# Patient Record
Sex: FEMALE | Race: BLACK OR AFRICAN AMERICAN | NOT HISPANIC OR LATINO | Employment: FULL TIME | URBAN - METROPOLITAN AREA
[De-identification: names, ages, dates, MRNs, and addresses within clinical notes are randomized per-mention and may not be internally consistent; named-entity substitution may affect disease eponyms.]

---

## 2019-06-14 ENCOUNTER — HOSPITAL ENCOUNTER (EMERGENCY)
Facility: HOSPITAL | Age: 24
Discharge: HOME | End: 2019-06-14
Attending: EMERGENCY MEDICINE
Payer: MEDICAID

## 2019-06-14 VITALS
SYSTOLIC BLOOD PRESSURE: 139 MMHG | RESPIRATION RATE: 20 BRPM | HEART RATE: 76 BPM | OXYGEN SATURATION: 100 % | TEMPERATURE: 98 F | DIASTOLIC BLOOD PRESSURE: 65 MMHG | WEIGHT: 293 LBS | HEIGHT: 63 IN | BODY MASS INDEX: 51.91 KG/M2

## 2019-06-14 DIAGNOSIS — M65.229: Primary | ICD-10-CM

## 2019-06-14 DIAGNOSIS — M77.01 MEDIAL EPICONDYLITIS OF ELBOW, RIGHT: ICD-10-CM

## 2019-06-14 DIAGNOSIS — M25.521 RIGHT ELBOW PAIN: ICD-10-CM

## 2019-06-14 PROCEDURE — 99283 EMERGENCY DEPT VISIT LOW MDM: CPT

## 2019-06-14 PROCEDURE — 25000003 PHARM REV CODE 250: Performed by: EMERGENCY MEDICINE

## 2019-06-14 RX ORDER — NAPROXEN 500 MG/1
500 TABLET ORAL
Status: COMPLETED | OUTPATIENT
Start: 2019-06-14 | End: 2019-06-14

## 2019-06-14 RX ORDER — NAPROXEN 500 MG/1
500 TABLET ORAL 2 TIMES DAILY WITH MEALS
Qty: 20 TABLET | Refills: 0 | Status: SHIPPED | OUTPATIENT
Start: 2019-06-14 | End: 2019-06-24

## 2019-06-14 RX ADMIN — NAPROXEN 500 MG: 500 TABLET ORAL at 08:06

## 2019-06-14 NOTE — ED PROVIDER NOTES
SCRIBE #1 NOTE: I, Ha Bellamy/Shaneka Harris, am scribing for, and in the presence of, Steven Elliott Jr., MD. I have scribed the entire note.      History      Chief Complaint   Patient presents with    Elbow Pain     non traumatic elbow pain x 2 days       Review of patient's allergies indicates:  No Known Allergies     HPI   HPI    6/14/2019, 7:23 AM   History obtained from the patient      History of Present Illness: Evy Wyman is a 23 y.o. female patient who presents to the Emergency Department c/o R elbow pain which onset gradually 2 days PTA. Symptoms are constant and moderate in severity. Sxs worse with movement, no mitigating factors. Patient denies any fever, chills, extremity weakness/numbness, falls, trauma, joint swelling, erythema, ecchymosis, and all other sxs at this time. No Prior Tx. No further complaints or concerns at this time.        Arrival mode: Personal vehicle      PCP: Primary Doctor No       Past Medical History:  History reviewed. No pertinent past medical history.    Past Surgical History:  Past Surgical History:   Procedure Laterality Date    CHOLECYSTECTOMY           Family History:  History reviewed. No pertinent family history.    Social History:  Social History     Tobacco Use    Smoking status: Never Smoker    Smokeless tobacco: Never Used   Substance and Sexual Activity    Alcohol use: Yes     Comment: occassionally    Drug use: Never    Sexual activity: Yes       ROS   Review of Systems   Constitutional: Negative for chills and fever.   HENT: Negative for sore throat.    Respiratory: Negative for shortness of breath.    Cardiovascular: Negative for chest pain.   Gastrointestinal: Negative for nausea.   Genitourinary: Negative for dysuria.   Musculoskeletal: Positive for arthralgias (R elbow). Negative for back pain and joint swelling.   Skin: Negative for color change and rash.   Neurological: Negative for weakness and numbness.   Hematological: Does not  "bruise/bleed easily.   All other systems reviewed and are negative.    Physical Exam      Initial Vitals   BP Pulse Resp Temp SpO2   06/14/19 0713 06/14/19 0713 06/14/19 0915 06/14/19 0713 06/14/19 0713   (!) 139/90 87 20 97.8 °F (36.6 °C) 100 %      MAP       --                 Physical Exam  Nursing Notes and Vital Signs Reviewed.  Constitutional: Patient is in no acute distress. Well-developed and well-nourished.  Head: Atraumatic. Normocephalic.  Eyes: PERRL. EOM intact. Conjunctivae are not pale. No scleral icterus.  ENT: Mucous membranes are moist. Oropharynx is clear and symmetric.    Neck: Supple. Full ROM. No lymphadenopathy.  Cardiovascular: Regular rate. Regular rhythm. No murmurs, rubs, or gallops. Distal pulses are 2+ and symmetric.  Pulmonary/Chest: No respiratory distress. Clear to auscultation bilaterally. No wheezing or rales.  Abdominal: Soft and non-distended.  There is no tenderness.  Musculoskeletal: Moves all extremities. No obvious deformities. No edema.  RUE: has no evident deformity. negative for swelling. R medial epicondyle TTP. Palpation reproduces pt's complaint of pain. Limited active ROM secondary to pain. Full passive ROM. Cap refill distally is <2 seconds. Radial pulses are equal and 2+ bilaterally. No motor deficit. No distal sensory deficit. NVI distally.   Skin: Warm and dry.  Neurological:  Alert, awake, and appropriate.  Normal speech.  No acute focal neurological deficits are appreciated.  Psychiatric: Normal affect. Good eye contact. Appropriate in content.    ED Course    Procedures  ED Vital Signs:  Vitals:    06/14/19 0713 06/14/19 0915   BP: (!) 139/90 139/65   Pulse: 87 76   Resp:  20   Temp: 97.8 °F (36.6 °C)    SpO2: 100% 100%   Weight: 135.3 kg (298 lb 4.8 oz)    Height: 5' 3" (1.6 m)      Imaging Results:  Imaging Results          X-Ray Elbow Complete Right (Final result)  Result time 06/14/19 08:12:28    Final result by Marquez Edwards MD (06/14/19 08:12:28)     "             Impression:      Please see.      Electronically signed by: Marquez Edwards MD  Date:    06/14/2019  Time:    08:12             Narrative:    EXAMINATION:  XR ELBOW COMPLETE 3 VIEW RIGHT    CLINICAL HISTORY:  Pain in right elbow,    TECHNIQUE:  Standard radiography performed.    COMPARISON:  None    FINDINGS:  No acute fracture or dislocation.    There is a minor calcification along the medial epicondyle which could represent evidence of calcific tendinosis.  Please correlate with clinical exam for medial elbow joint pain.    Otherwise negative.                                        The Emergency Provider reviewed the vital signs and test results, which are outlined above.    ED Discussion     8:52 AM: Reassessed pt at this time.  Pt states her condition has improved at this time. Discussed with pt all pertinent ED information and results. Discussed pt dx and plan of tx. Gave pt all f/u and return to the ED instructions. All questions and concerns were addressed at this time. Pt expresses understanding of information and instructions, and is comfortable with plan to discharge. Pt is stable for discharge.    I discussed with patient and/or family/caretaker that evaluation in the ED does not suggest any emergent or life threatening medical conditions requiring immediate intervention beyond what was provided in the ED, and I believe patient is safe for discharge.  Regardless, an unremarkable evaluation in the ED does not preclude the development or presence of a serious of life threatening condition. As such, patient was instructed to return immediately for any worsening or change in current symptoms.    Regarding EPICONDYLITIS, for treatment, I advised patient to allow arm to rest, wear ace bandage for support, take NSAIDs for pain and swelling, and apply ice or heat compresses if needed. For prevention, instructed patient to modify stressful activities, exercise regularly, avoid repetitive movements,  employ smooth movement of the elbow, and avoid exerting the elbow joint.    Regarding tendonitis, I advised patient to: REST the injured area or use it less than usual; apply ICE to decrease swelling and pain and help prevent tissue damage; use COMPRESSION with an elastic bandage to support the area and decrease swelling; ELEVATE injured body part above the level of the heart to help decrease pain and swelling; AVOID using massage or massage to acute injuries as it may slow healing of the area; AVOID drinking alcohol as it may slow healing of the injury; and avoid stretching injured muscles. Advised patient to return to the emergency department or contact primary care provider if: having trouble moving injured area; notice tingling or numbness in or near the injured area; extremity below the bruise gets cold or turns pale; a new lump develops in the injured area; symptoms do not improve with treatment after 4 to 5 days; there is any questions or concerns about the condition or treatment plan.       ED Medication(s):  Medications   naproxen tablet 500 mg (500 mg Oral Given 6/14/19 0800)     Discharge Medication List as of 6/14/2019  8:52 AM      START taking these medications    Details   naproxen (EC NAPROSYN) 500 MG EC tablet Take 1 tablet (500 mg total) by mouth 2 (two) times daily with meals. for 10 days, Starting Fri 6/14/2019, Until Mon 6/24/2019, Print             Follow-up Information     [facility]. Schedule an appointment as soon as possible for a visit in 1 week.    Specialty:  Internal Medicine  Contact information:  [address] [facility] [address] [phone]  Additional information:  2nd Floor - From [address], take the [facility] exit [address] Enter the facility from the [address]           [facility]. Schedule an appointment as soon as possible for a visit in 1 week.    Contact information:  [address] [facility] [address] [facility]  [address]  [phone]             [facility]    Specialty:  Emergency Medicine  Why:  As needed, If symptoms worsen  Contact information:  [address]  [facility] [address] [phone]                   Medical Decision Making    Medical Decision Making:   Clinical Tests:   Radiological Study: Ordered and Reviewed           Scribe Attestation:   Scribe #1: I performed the above scribed service and the documentation accurately describes the services I performed. I attest to the accuracy of the note.    Attending:   Physician Attestation Statement for Scribe #1: I, Steven Elliott Jr., MD, personally performed the services described in this documentation, as scribed by Ha Bellamy/Shaneka Harris, in my presence, and it is both accurate and complete.          Clinical Impression       ICD-10-CM ICD-9-CM   1. Calcific tendinitis of elbow M65.28 727.82   2. Right elbow pain M25.521 719.42   3. Medial epicondylitis of elbow, right M77.01 726.31       Disposition:   Disposition: Discharged  Condition: Stable         Steven Elliott Jr., MD  06/16/19 0845

## 2019-06-14 NOTE — ED NOTES
Patient c/o right elbow pain - onset x2 days. Reports pain 8/10 - more painful when patient extends her arm. Denies trauma or injury. Ice applied to affected area.     Patient moved to ED room 11, patient assisted onto stretcher and changed into a gown. Patient placed on continuous pulse oximetry and automatic blood pressure cuff. Bed placed in low locked position, side rails up x 2, call light is within reach of patient or family, orientation to room and explanation of wait provided to family and patient, alarms set and turned on for monitor and pulse ox, awaiting MD evaluation and orders, will continue to monitor.    Patient identifies self as Evy Wyman      LOC: The patient is awake, alert and aware of environment with an appropriate affect, the patient is oriented x 3 and speaking appropriately.  APPEARANCE: Patient resting comfortably and in no acute distress, patient is clean and well groomed, patient's clothing is properly fastened.  SKIN: The skin is warm and dry, color consistent with ethnicity, patient has normal skin turgor and moist mucus membranes, skin intact, no breakdown or bruising noted.  MUSCULOSKELETAL: Patient moving all extremities well, no obvious swelling or deformities noted.   RESPIRATORY: Airway is open and patent, respirations are spontaneous, patient has a normal effort and rate, no accessory muscle use noted.  CARDIAC: Patient has a normal rate and rhythm, no periphreal edema noted, capillary refill < 3 seconds.  ABDOMEN: Soft and non tender to palpation, no distention noted.  NEUROLOGIC: PERRL, eyes open spontaneously, behavior appropriate to situation, follows commands, facial expression symmetrical, bilateral hand grasp equal and even, purposeful motor response noted, normal sensation in all extremities when touched with a finger.

## 2019-06-14 NOTE — DISCHARGE INSTRUCTIONS
Concerning EPICONDYLITIS, discussed with pt on:  What is elbow tendinopathy? -- Elbow tendinopathy is a condition that causes elbow pain and forearm weakness. Doctors use the term elbow tendinopathy when people have a problem with a tendon in the elbow. Tendons are strong bands of tissue that connect muscles to bones. Depending on which elbow tendon is injured, the condition is also known as tennis elbow or golf elbow.  In most people with elbow tendinopathy, the tendons are not inflamed or swollen. If they do get inflamed or swollen, doctors call it tendinitis.   Tendinitis usually starts suddenly. Tendinopathy usually happens over a longer period of time.    What causes elbow tendinopathy? -- This condition can happen as people get older, especially if they do a lot of work or activity using their elbow and forearm. Tendinitis can happen when people get hurt or do the same movements over and over.   What are the symptoms of elbow tendinopathy? -- The most common symptoms are:  ?Elbow pain - The pain can start slowly or suddenly. It can spread to the upper arm or forearm.  ?Weakness of the forearm muscles  ?Swelling (if people have tendinitis)    Is there a test for elbow tendinopathy? -- No. But your doctor or nurse should be able to tell if you have it by talking with you and doing an exam.    How is elbow tendinopathy treated? -- Most of the time, this condition will get better on its own, but it can take months to heal completely. To help get better, you can:  ?Rest your elbow and arm - Ask your doctor about wearing an arm brace.  ?Take a pain-relieving medicine - Your doctor might recommend that you take a medicine such as acetaminophen (sample brand name: Tylenol), ibuprofen (sample brand names: Advil, Motrin), or naproxen (sample brand names: Aleve, Naprosyn).     Is there anything I can do on my own to feel better? -- Yes. You can do different exercises to help with your symptoms. The right  exercises for you will depend on which elbow tendon is injured. The following exercises can help stretch the lower arm muscles:  ?Tennis elbow stretch - Hold your injured arm straight out, and point your fingers down to the ground. Use your other hand (with the thumb pressing on the palm) to grab this hand. Then press down on the back of the hand to bend the wrist more. Hold this position for 30 seconds. Repeat the stretch 3 times. Do this exercise 1 time a day.   ?Golf elbow stretch - Stand an arms length away from the wall, with the injured arm closest to the wall. Put your palm on the wall, with your fingers pointing down. Press gently against the wall to stretch your muscles. Hold this position for 30 seconds. Repeat the stretch 3 times. Do this exercise 1 time a day.   Other types of exercises can help make the forearm muscles stronger. Your doctor, nurse, or physical therapist (exercise expert) can show you how to do these types of exercises. He or she will tell you when to start them and how often to do them.    What if my symptoms dont get better? -- If your symptoms dont get better, talk with your doctor or nurse. He or she can suggest other treatments, such as physical therapy or a shot of medicine in the tendon.  Can elbow tendinopathy be prevented? -- Yes. To help prevent elbow tendinopathy, you can:  ?Take breaks when you do activities in which you move your elbow and wrist a lot.  ?Keep your elbows slightly bent when you exercise or lift things.  ?Wear gloves or use 2 hands when using tools.  ?Use a 2-handed backhand swing in tennis.  ?Use  tape or padding on your golf clubs.

## 2020-07-02 ENCOUNTER — HOSPITAL ENCOUNTER (EMERGENCY)
Facility: HOSPITAL | Age: 25
Discharge: HOME | End: 2020-07-03
Attending: FAMILY MEDICINE
Payer: MEDICAID

## 2020-07-02 DIAGNOSIS — N76.0 BV (BACTERIAL VAGINOSIS): Primary | ICD-10-CM

## 2020-07-02 DIAGNOSIS — B96.89 BV (BACTERIAL VAGINOSIS): Primary | ICD-10-CM

## 2020-07-02 LAB
ALBUMIN SERPL BCP-MCNC: 3.1 G/DL (ref 3.5–5.2)
ALP SERPL-CCNC: 66 U/L (ref 55–135)
ALT SERPL W/O P-5'-P-CCNC: 10 U/L (ref 10–44)
ANION GAP SERPL CALC-SCNC: 12 MMOL/L (ref 8–16)
AST SERPL-CCNC: 8 U/L (ref 10–40)
B-HCG UR QL: NEGATIVE
BACTERIA #/AREA URNS HPF: NORMAL /HPF
BACTERIA GENITAL QL WET PREP: ABNORMAL
BASOPHILS # BLD AUTO: 0.02 K/UL (ref 0–0.2)
BASOPHILS NFR BLD: 0.2 % (ref 0–1.9)
BILIRUB SERPL-MCNC: 0.2 MG/DL (ref 0.1–1)
BILIRUB UR QL STRIP: NEGATIVE
BUN SERPL-MCNC: 11 MG/DL (ref 6–20)
CALCIUM SERPL-MCNC: 8.8 MG/DL (ref 8.7–10.5)
CHLORIDE SERPL-SCNC: 102 MMOL/L (ref 95–110)
CLARITY UR: CLEAR
CLUE CELLS VAG QL WET PREP: ABNORMAL
CO2 SERPL-SCNC: 23 MMOL/L (ref 23–29)
COLOR UR: YELLOW
CREAT SERPL-MCNC: 0.7 MG/DL (ref 0.5–1.4)
DIFFERENTIAL METHOD: ABNORMAL
EOSINOPHIL # BLD AUTO: 0 K/UL (ref 0–0.5)
EOSINOPHIL NFR BLD: 0.4 % (ref 0–8)
ERYTHROCYTE [DISTWIDTH] IN BLOOD BY AUTOMATED COUNT: 16.6 % (ref 11.5–14.5)
EST. GFR  (AFRICAN AMERICAN): >60 ML/MIN/1.73 M^2
EST. GFR  (NON AFRICAN AMERICAN): >60 ML/MIN/1.73 M^2
FILAMENT FUNGI VAG WET PREP-#/AREA: ABNORMAL
GLUCOSE SERPL-MCNC: 101 MG/DL (ref 70–110)
GLUCOSE UR QL STRIP: NEGATIVE
HCT VFR BLD AUTO: 32.6 % (ref 37–48.5)
HGB BLD-MCNC: 10.3 G/DL (ref 12–16)
HGB UR QL STRIP: ABNORMAL
HYALINE CASTS #/AREA URNS LPF: 0 /LPF
IMM GRANULOCYTES # BLD AUTO: 0.03 K/UL (ref 0–0.04)
IMM GRANULOCYTES NFR BLD AUTO: 0.3 % (ref 0–0.5)
KETONES UR QL STRIP: NEGATIVE
LEUKOCYTE ESTERASE UR QL STRIP: NEGATIVE
LIPASE SERPL-CCNC: 15 U/L (ref 4–60)
LYMPHOCYTES # BLD AUTO: 2.7 K/UL (ref 1–4.8)
LYMPHOCYTES NFR BLD: 28.2 % (ref 18–48)
MCH RBC QN AUTO: 25.5 PG (ref 27–31)
MCHC RBC AUTO-ENTMCNC: 31.6 G/DL (ref 32–36)
MCV RBC AUTO: 81 FL (ref 82–98)
MICROSCOPIC COMMENT: NORMAL
MONOCYTES # BLD AUTO: 0.6 K/UL (ref 0.3–1)
MONOCYTES NFR BLD: 6 % (ref 4–15)
NEUTROPHILS # BLD AUTO: 6.1 K/UL (ref 1.8–7.7)
NEUTROPHILS NFR BLD: 64.9 % (ref 38–73)
NITRITE UR QL STRIP: NEGATIVE
NRBC BLD-RTO: 0 /100 WBC
PH UR STRIP: 6 [PH] (ref 5–8)
PLATELET # BLD AUTO: 326 K/UL (ref 150–350)
PMV BLD AUTO: 9.8 FL (ref 9.2–12.9)
POTASSIUM SERPL-SCNC: 4 MMOL/L (ref 3.5–5.1)
PROT SERPL-MCNC: 7.5 G/DL (ref 6–8.4)
PROT UR QL STRIP: ABNORMAL
RBC # BLD AUTO: 4.04 M/UL (ref 4–5.4)
RBC #/AREA URNS HPF: 2 /HPF (ref 0–4)
SODIUM SERPL-SCNC: 137 MMOL/L (ref 136–145)
SP GR UR STRIP: 1.02 (ref 1–1.03)
SPECIMEN SOURCE: ABNORMAL
SQUAMOUS #/AREA URNS HPF: 3 /HPF
T VAGINALIS GENITAL QL WET PREP: ABNORMAL
URN SPEC COLLECT METH UR: ABNORMAL
UROBILINOGEN UR STRIP-ACNC: NEGATIVE EU/DL
WBC # BLD AUTO: 9.39 K/UL (ref 3.9–12.7)
WBC #/AREA URNS HPF: 0 /HPF (ref 0–5)
WBC #/AREA VAG WET PREP: ABNORMAL
YEAST GENITAL QL WET PREP: ABNORMAL

## 2020-07-02 PROCEDURE — 83690 ASSAY OF LIPASE: CPT

## 2020-07-02 PROCEDURE — 81025 URINE PREGNANCY TEST: CPT

## 2020-07-02 PROCEDURE — 96361 HYDRATE IV INFUSION ADD-ON: CPT

## 2020-07-02 PROCEDURE — 25000003 PHARM REV CODE 250: Performed by: FAMILY MEDICINE

## 2020-07-02 PROCEDURE — 87210 SMEAR WET MOUNT SALINE/INK: CPT

## 2020-07-02 PROCEDURE — 96360 HYDRATION IV INFUSION INIT: CPT

## 2020-07-02 PROCEDURE — 85025 COMPLETE CBC W/AUTO DIFF WBC: CPT

## 2020-07-02 PROCEDURE — 81000 URINALYSIS NONAUTO W/SCOPE: CPT

## 2020-07-02 PROCEDURE — 80053 COMPREHEN METABOLIC PANEL: CPT

## 2020-07-02 PROCEDURE — 36415 COLL VENOUS BLD VENIPUNCTURE: CPT

## 2020-07-02 PROCEDURE — 99284 EMERGENCY DEPT VISIT MOD MDM: CPT | Mod: 25

## 2020-07-02 PROCEDURE — 87491 CHLMYD TRACH DNA AMP PROBE: CPT

## 2020-07-02 RX ADMIN — SODIUM CHLORIDE 1000 ML: 0.9 INJECTION, SOLUTION INTRAVENOUS at 11:07

## 2020-07-03 VITALS
OXYGEN SATURATION: 100 % | HEART RATE: 93 BPM | TEMPERATURE: 96 F | DIASTOLIC BLOOD PRESSURE: 86 MMHG | HEIGHT: 63 IN | SYSTOLIC BLOOD PRESSURE: 120 MMHG | WEIGHT: 293 LBS | RESPIRATION RATE: 18 BRPM | BODY MASS INDEX: 51.91 KG/M2

## 2020-07-03 RX ORDER — METRONIDAZOLE 500 MG/1
500 TABLET ORAL 3 TIMES DAILY
Qty: 21 TABLET | Refills: 0 | Status: SHIPPED | OUTPATIENT
Start: 2020-07-03 | End: 2020-07-10

## 2020-07-03 NOTE — ED NOTES
Pt AAOx3, resting in bed, eyes open side rails up x 2, call bell within reach, even unlabored respiration with equal rise and fall of the chest wall. NAD at this time. Will continue to monitor.

## 2020-07-03 NOTE — ED PROVIDER NOTES
"SCRIBE #1 NOTE: I, Kolbymichael Collins, am scribing for, and in the presence of, Kadi Doran MD. I have scribed the entire note, except imaging results.     SCRIBE #2 NOTE: I, Zofia Cordova, am scribing for, and in the presence of,  Kadi Doran MD. I have scribed the remaining portions of the note not scribed by Scribe #1.     History      Chief Complaint   Patient presents with    Abdominal Pain     Right sided and lower abdominal pain"something is wrong with my ovaries, it feels like labor but i'm not pregnant"       Review of patient's allergies indicates:  No Known Allergies     HPI   HPI    7/2/2020, 10:36 PM   History obtained from the patient      History of Present Illness: Evy Wyman is a 24 y.o. female patient who presents to the Emergency Department for lower abdominal pain, onset 2 days PTA. Pt states that her pain began in her RLQ. Symptoms are intermittent and moderate in severity. No mitigating or exacerbating factors reported. No associated sxs reported. Patient denies any fever, chills, n/v/d, SOB, CP, weakness, numbness, dizziness, headache, and all other sxs at this time. Pt has been on abx x 2 days to treat a UTI. Last menstrual cycle from 6/23-6/26. No further complaints or concerns at this time.     Arrival mode: Personal vehicle    PCP: Primary Doctor No       Past Medical History:  History reviewed. No pertinent past medical history.    Past Surgical History:  Past Surgical History:   Procedure Laterality Date    CHOLECYSTECTOMY           Family History:  History reviewed. No pertinent family history.    Social History:  Social History     Tobacco Use    Smoking status: Never Smoker    Smokeless tobacco: Never Used   Substance and Sexual Activity    Alcohol use: Yes     Comment: occassionally    Drug use: Never    Sexual activity: Yes       ROS   Review of Systems   Constitutional: Negative for chills, diaphoresis, fatigue and fever.   HENT: Negative for sore throat.  "   Respiratory: Negative for shortness of breath.    Cardiovascular: Negative for chest pain.   Gastrointestinal: Positive for abdominal pain (lower). Negative for diarrhea, nausea and vomiting.   Genitourinary: Negative for dysuria.   Musculoskeletal: Negative for back pain.   Skin: Negative for rash.   Neurological: Negative for dizziness, seizures, weakness, light-headedness, numbness and headaches.   Hematological: Does not bruise/bleed easily.   All other systems reviewed and are negative.    Physical Exam      Initial Vitals [07/02/20 2154]   BP Pulse Resp Temp SpO2   (!) 137/91 93 18 98.5 °F (36.9 °C) 100 %      MAP       --          Physical Exam  Nursing Notes and Vital Signs Reviewed.  Constitutional: Patient is in no acute distress. Well-developed and well-nourished.  Head: Atraumatic. Normocephalic.  Eyes: PERRL. EOM intact. Conjunctivae are not pale. No scleral icterus.  ENT: Mucous membranes are moist. Oropharynx is clear and symmetric.    Neck: Supple. Full ROM. No lymphadenopathy.  Cardiovascular: Regular rate. Regular rhythm. No murmurs, rubs, or gallops. Distal pulses are 2+ and symmetric.  Pulmonary/Chest: No respiratory distress. Clear to auscultation bilaterally. No wheezing or rales.  Abdominal: Soft and non-distended.  There is no tenderness.  No rebound, guarding, or rigidity.  Pelvic: A female chaperone was present for this examination. No lesions or abnormalities were visible on the labia majora or minora. Cervical os is closed. There is no CMT. There is no blood in the vaginal vault. White discharge noted. No adnexal tenderness. No adnexal masses.  Musculoskeletal: Moves all extremities. No obvious deformities. No edema.  Skin: Warm and dry.  Neurological:  Alert, awake, and appropriate.  Normal speech.  No acute focal neurological deficits are appreciated.  Psychiatric: Normal affect. Good eye contact. Appropriate in content.    ED Course    Procedures  ED Vital Signs:  Vitals:    07/02/20  "2154 07/02/20 2309 07/03/20 0123 07/03/20 0255   BP: (!) 137/91 130/80 119/62 120/86   Pulse: 93 80 87 93   Resp: 18 20 18   Temp: 98.5 °F (36.9 °C)  96.3 °F (35.7 °C)    TempSrc: Oral  Oral    SpO2: 100% 98%  100%   Weight: (!) 139.7 kg (307 lb 14 oz)      Height: 5' 3" (1.6 m)          Abnormal Lab Results:  Labs Reviewed   CBC W/ AUTO DIFFERENTIAL - Abnormal; Notable for the following components:       Result Value    Hemoglobin 10.3 (*)     Hematocrit 32.6 (*)     Mean Corpuscular Volume 81 (*)     Mean Corpuscular Hemoglobin 25.5 (*)     Mean Corpuscular Hemoglobin Conc 31.6 (*)     RDW 16.6 (*)     All other components within normal limits   COMPREHENSIVE METABOLIC PANEL - Abnormal; Notable for the following components:    Albumin 3.1 (*)     AST 8 (*)     All other components within normal limits   URINALYSIS, REFLEX TO URINE CULTURE - Abnormal; Notable for the following components:    Protein, UA 1+ (*)     Occult Blood UA Trace (*)     All other components within normal limits    Narrative:     Specimen Source->Urine   VAGINAL SCREEN - Abnormal; Notable for the following components:    Clue Cells Few (*)     WBC - Vaginal Screen Occasional (*)     Bacteria - Vaginal Screen Many (*)     All other components within normal limits   C. TRACHOMATIS/N. GONORRHOEAE BY AMP DNA   LIPASE   PREGNANCY TEST, URINE RAPID    Narrative:     Specimen Source->Urine   URINALYSIS MICROSCOPIC    Narrative:     Specimen Source->Urine        All Lab Results:  Results for orders placed or performed during the hospital encounter of 07/02/20   CBC W/ AUTO DIFFERENTIAL   Result Value Ref Range    WBC 9.39 3.90 - 12.70 K/uL    RBC 4.04 4.00 - 5.40 M/uL    Hemoglobin 10.3 (L) 12.0 - 16.0 g/dL    Hematocrit 32.6 (L) 37.0 - 48.5 %    Mean Corpuscular Volume 81 (L) 82 - 98 fL    Mean Corpuscular Hemoglobin 25.5 (L) 27.0 - 31.0 pg    Mean Corpuscular Hemoglobin Conc 31.6 (L) 32.0 - 36.0 g/dL    RDW 16.6 (H) 11.5 - 14.5 %    Platelets 326 " 150 - 350 K/uL    MPV 9.8 9.2 - 12.9 fL    Immature Granulocytes 0.3 0.0 - 0.5 %    Gran # (ANC) 6.1 1.8 - 7.7 K/uL    Immature Grans (Abs) 0.03 0.00 - 0.04 K/uL    Lymph # 2.7 1.0 - 4.8 K/uL    Mono # 0.6 0.3 - 1.0 K/uL    Eos # 0.0 0.0 - 0.5 K/uL    Baso # 0.02 0.00 - 0.20 K/uL    nRBC 0 0 /100 WBC    Gran% 64.9 38.0 - 73.0 %    Lymph% 28.2 18.0 - 48.0 %    Mono% 6.0 4.0 - 15.0 %    Eosinophil% 0.4 0.0 - 8.0 %    Basophil% 0.2 0.0 - 1.9 %    Differential Method Automated    Comp. Metabolic Panel   Result Value Ref Range    Sodium 137 136 - 145 mmol/L    Potassium 4.0 3.5 - 5.1 mmol/L    Chloride 102 95 - 110 mmol/L    CO2 23 23 - 29 mmol/L    Glucose 101 70 - 110 mg/dL    BUN, Bld 11 6 - 20 mg/dL    Creatinine 0.7 0.5 - 1.4 mg/dL    Calcium 8.8 8.7 - 10.5 mg/dL    Total Protein 7.5 6.0 - 8.4 g/dL    Albumin 3.1 (L) 3.5 - 5.2 g/dL    Total Bilirubin 0.2 0.1 - 1.0 mg/dL    Alkaline Phosphatase 66 55 - 135 U/L    AST 8 (L) 10 - 40 U/L    ALT 10 10 - 44 U/L    Anion Gap 12 8 - 16 mmol/L    eGFR if African American >60 >60 mL/min/1.73 m^2    eGFR if non African American >60 >60 mL/min/1.73 m^2   Lipase   Result Value Ref Range    Lipase 15 4 - 60 U/L   Urinalysis, Reflex to Urine Culture Urine, Clean Catch    Specimen: Urine   Result Value Ref Range    Specimen UA Urine, Catheterized     Color, UA Yellow Yellow, Straw, Surekha    Appearance, UA Clear Clear    pH, UA 6.0 5.0 - 8.0    Specific Gravity, UA 1.025 1.005 - 1.030    Protein, UA 1+ (A) Negative    Glucose, UA Negative Negative    Ketones, UA Negative Negative    Bilirubin (UA) Negative Negative    Occult Blood UA Trace (A) Negative    Nitrite, UA Negative Negative    Urobilinogen, UA Negative <2.0 EU/dL    Leukocytes, UA Negative Negative   Pregnancy, urine rapid   Result Value Ref Range    Preg Test, Ur Negative    Vaginal Screen Vagina   Result Value Ref Range    Trichomonas None None    Clue Cells Few (A) None    Budding Yeast None None    Fungal Hyphae None  None    WBC - Vaginal Screen Occasional (A) None    Bacteria - Vaginal Screen Many (A) None    Wet Prep Source Vagina    Urinalysis Microscopic   Result Value Ref Range    RBC, UA 2 0 - 4 /hpf    WBC, UA 0 0 - 5 /hpf    Bacteria Occasional None-Occ /hpf    Squam Epithel, UA 3 /hpf    Hyaline Casts, UA 0 0-1/lpf /lpf    Microscopic Comment SEE COMMENT      Imaging Results:  Imaging Results          US Pelvis Comp with Transvag NON-OB (xpd (Final result)  Result time 07/03/20 08:21:06    Final result by Timoteo Ku MD (07/03/20 08:21:06)                 Impression:      1. Normal transabdominal and transvaginal pelvic ultrasound.  The uterus and ovaries appear normal.  Negative for torsion.    2.  I agree with the preliminary interpretation rendered.      Electronically signed by: Timoteo Ku MD  Date:    07/03/2020  Time:    08:21             Narrative:    EXAMINATION:  US PELVIS COMP WITH TRANSVAGINAL NON-OB (XPD)    CLINICAL HISTORY:  Bilateral pelvic pain;    COMPARISON:  No comparison studies are available.    FINDINGS:  The uterus measures 8.6 x 4.9 x 3.4 cm. The uterine volume is 59 cc. Endometrial stripe measures 6 mm. Negative for endometrial or myometrial abnormalities. The visualized portions of the cervix, vagina and urinary bladder are normal.  Negative for free fluid in the cul-de-sac.    The right ovary measures 3.9 x 2.2 x 2.1 cm, and the left ovary measures 3.4 x 2.2 x 2.1 cm. Negative for ovary and or adnexal masses. Prominent follicles on both ovaries.    Color flow and spectral Doppler interrogation of the ovaries was made.  Left ovary velocity is 14 centimeters/second.  Right ovary velocity is 11 centimeters/second.                               Preliminary US results: NAF.                The Emergency Provider reviewed the vital signs and test results, which are outlined above.    ED Discussion     2:37 AM: Reassessed pt at this time. Discussed with pt all pertinent ED information and  results.  Patient reports to having some relief of symptoms once Andrews catheter was placed.  At this time I believe her symptomatology is likely due to her or ready diagnosed UTI.  She has also bacterial vaginosis virtual be treating her with Flagyl.  Recommend that she continue Macrobid as prescribed outpatient.  Discussed pt dx and plan of tx. Gave pt all f/u and return to the ED instructions. All questions and concerns were addressed at this time. Pt expresses understanding of information and instructions, and is comfortable with plan to discharge. Pt is stable for discharge.    I discussed with patient and/or family/caretaker that evaluation in the ED does not suggest any emergent or life threatening medical conditions requiring immediate intervention beyond what was provided in the ED, and I believe patient is safe for discharge.  Regardless, an unremarkable evaluation in the ED does not preclude the development or presence of a serious of life threatening condition. As such, patient was instructed to return immediately for any worsening or change in current symptoms.         ED Medication(s):  Medications   sodium chloride 0.9% bolus 1,000 mL (0 mLs Intravenous Stopped 7/3/20 0153)     Discharge Medication List as of 7/3/2020  2:19 AM      START taking these medications    Details   metroNIDAZOLE (FLAGYL) 500 MG tablet Take 1 tablet (500 mg total) by mouth 3 (three) times daily. for 7 days, Starting Fri 7/3/2020, Until Fri 7/10/2020, Print           Follow-up Information     PCP. Schedule an appointment as soon as possible for a visit in 3 days.    Why: As needed, If symptoms worsen                   Medical Decision Making    Medical Decision Making:   Clinical Tests:   Lab Tests: Ordered and Reviewed  Radiological Study: Ordered and Reviewed           Scribe Attestation:   Scribe #1: I performed the above scribed service and the documentation accurately describes the services I performed. I attest to the  "accuracy of the note.    Attending:   Physician Attestation Statement for Scribe #1: I, Kadi Doran MD, personally performed the services described in this documentation, as scribed by Kolby Collins, in my presence, and it is both accurate and complete.       Scribe Attestation:   Scribe #2: I performed the above scribed service and the documentation accurately describes the services I performed. I attest to the accuracy of the note.    Attending Attestation:           Physician Attestation for Scribe:    Physician Attestation Statement for Scribe #2: I, Kadi Doran MD, reviewed documentation, as scribed by Zofia Cordova in my presence, and it is both accurate and complete. I also acknowledge and confirm the content of the note done by Scribe #1.          Clinical Impression       ICD-10-CM ICD-9-CM   1. BV (bacterial vaginosis)  N76.0 616.10    B96.89 041.9       Disposition:   Disposition: Discharged  Condition: Stable    Portions of this note may have been created with voice recognition software. Occasional "wrong-word" or "sound-a-like" substitutions may have occurred due to the inherent limitations of voice recognition software. Please, read the note carefully and recognize, using context, where substitutions have occurred.          Kadi Doran MD  07/03/20 8104    "

## 2020-07-05 LAB
C TRACH DNA SPEC QL NAA+PROBE: NOT DETECTED
N GONORRHOEA DNA SPEC QL NAA+PROBE: NOT DETECTED

## 2020-09-23 ENCOUNTER — TELEPHONE (OUTPATIENT)
Dept: OBSTETRICS AND GYNECOLOGY | Facility: CLINIC | Age: 25
End: 2020-09-23

## 2020-09-23 NOTE — TELEPHONE ENCOUNTER
----- Message from Cheli Palm sent at 9/23/2020 11:59 AM CDT -----  Pt called to set up NP appointment for abnormal pap smear pt wants to see a woman only pt can be reached at [phone]

## 2020-09-23 NOTE — TELEPHONE ENCOUNTER
Left message for patient to return call to [phone].    Patient calling to see someone for abnormal pap.  She would be new patient with no internal referral and medicaid.  She will not be able to schedule with us.  She can try [facility] or [facility].

## 2020-09-24 ENCOUNTER — TELEPHONE (OUTPATIENT)
Dept: OBSTETRICS AND GYNECOLOGY | Facility: CLINIC | Age: 25
End: 2020-09-24

## 2020-09-24 NOTE — TELEPHONE ENCOUNTER
Attempted to return call, no answer but LVM that call was returned.  Upon return call, will offer [facility] at [phone], if call is in regards to a GYN appt.

## 2020-09-24 NOTE — TELEPHONE ENCOUNTER
----- Message from Ermias Boyer sent at 9/23/2020  4:34 PM CDT -----  Regarding: Appt request  Please call Evy to discuss her not having a cycle since July and needing to see someone [phone] (home).

## 2020-09-24 NOTE — TELEPHONE ENCOUNTER
Returned phone call to patient, patient requested to schedule GYN appt. Patient currently has medicaid, and was informed at this time, we are not accepting new medicaid patients. Patient stated that she currently has [employer] insurance. Patient transferred to phone desk to add insurance info.

## 2020-09-24 NOTE — TELEPHONE ENCOUNTER
----- Message from Melany Carrillo sent at 9/24/2020  8:38 AM CDT -----  Type:  Patient Returning Call    Who Called:patient  Who Left Message for Patient nurse  Does the patient know what this is regarding?:appt  Would the patient rather a call back or a response via [facility]? Call back  Best Call Back Number:[phone]  Additional Information:na

## 2020-09-25 NOTE — TELEPHONE ENCOUNTER
Returned call to patient.  She requested an appt for irregular cycle.  (-) upt.  Made her aware that no appointment is populating and offered the number to [facility], she declined offer.  Says that she will have new insurance, but is waiting on a member ID number, per patient.  Encouraged her to contact our clinic with member ID, she verbalized understanding.

## 2020-09-25 NOTE — TELEPHONE ENCOUNTER
----- Message from Rachelle Paris sent at 9/24/2020  4:47 PM CDT -----  Regarding: appt access  Type:  Sooner Apoointment Request    Caller is requesting a sooner appointment.  Caller declined first available appointment listed below.  Caller will not accept being placed on the waitlist and is requesting a message be sent to doctor.  Name of Caller:pt  When is the first available appointment?n/a  Symptoms:check up  Would the patient rather a call back or a response via [facility]? Call back  Best Call Back Number:[phone]  Additional Information: Please call back.Thanks